# Patient Record
Sex: MALE | Race: WHITE | URBAN - METROPOLITAN AREA
[De-identification: names, ages, dates, MRNs, and addresses within clinical notes are randomized per-mention and may not be internally consistent; named-entity substitution may affect disease eponyms.]

---

## 2017-04-24 ENCOUNTER — OFFICE VISIT (OUTPATIENT)
Dept: FAMILY MEDICINE | Facility: CLINIC | Age: 42
End: 2017-04-24
Payer: COMMERCIAL

## 2017-04-24 VITALS
BODY MASS INDEX: 35.22 KG/M2 | HEART RATE: 87 BPM | DIASTOLIC BLOOD PRESSURE: 68 MMHG | OXYGEN SATURATION: 98 % | WEIGHT: 232.38 LBS | HEIGHT: 68 IN | TEMPERATURE: 98.5 F | SYSTOLIC BLOOD PRESSURE: 112 MMHG

## 2017-04-24 DIAGNOSIS — K76.0 FATTY INFILTRATION OF LIVER: ICD-10-CM

## 2017-04-24 DIAGNOSIS — F41.9 ANXIETY: ICD-10-CM

## 2017-04-24 DIAGNOSIS — E66.01 MORBID OBESITY DUE TO EXCESS CALORIES (H): ICD-10-CM

## 2017-04-24 DIAGNOSIS — E78.5 HYPERLIPIDEMIA LDL GOAL <100: ICD-10-CM

## 2017-04-24 DIAGNOSIS — G47.09 OTHER INSOMNIA: ICD-10-CM

## 2017-04-24 DIAGNOSIS — Z72.0 TOBACCO ABUSE: ICD-10-CM

## 2017-04-24 DIAGNOSIS — G47.33 OBSTRUCTIVE SLEEP APNEA: Primary | ICD-10-CM

## 2017-04-24 DIAGNOSIS — F10.20 UNCOMPLICATED ALCOHOL DEPENDENCE (H): ICD-10-CM

## 2017-04-24 DIAGNOSIS — I10 ESSENTIAL HYPERTENSION WITH GOAL BLOOD PRESSURE LESS THAN 140/90: ICD-10-CM

## 2017-04-24 DIAGNOSIS — R79.89 LOW TESTOSTERONE: ICD-10-CM

## 2017-04-24 LAB
BASOPHILS # BLD AUTO: 0 10E9/L (ref 0–0.2)
BASOPHILS NFR BLD AUTO: 0.5 %
DIFFERENTIAL METHOD BLD: NORMAL
EOSINOPHIL # BLD AUTO: 0.1 10E9/L (ref 0–0.7)
EOSINOPHIL NFR BLD AUTO: 2 %
ERYTHROCYTE [DISTWIDTH] IN BLOOD BY AUTOMATED COUNT: 13.6 % (ref 10–15)
HCT VFR BLD AUTO: 46.2 % (ref 40–53)
HGB BLD-MCNC: 15.4 G/DL (ref 13.3–17.7)
LYMPHOCYTES # BLD AUTO: 1.6 10E9/L (ref 0.8–5.3)
LYMPHOCYTES NFR BLD AUTO: 28.5 %
MCH RBC QN AUTO: 31.5 PG (ref 26.5–33)
MCHC RBC AUTO-ENTMCNC: 33.3 G/DL (ref 31.5–36.5)
MCV RBC AUTO: 95 FL (ref 78–100)
MONOCYTES # BLD AUTO: 0.5 10E9/L (ref 0–1.3)
MONOCYTES NFR BLD AUTO: 8.8 %
NEUTROPHILS # BLD AUTO: 3.4 10E9/L (ref 1.6–8.3)
NEUTROPHILS NFR BLD AUTO: 60.2 %
PLATELET # BLD AUTO: 194 10E9/L (ref 150–450)
RBC # BLD AUTO: 4.89 10E12/L (ref 4.4–5.9)
WBC # BLD AUTO: 5.6 10E9/L (ref 4–11)

## 2017-04-24 PROCEDURE — 83721 ASSAY OF BLOOD LIPOPROTEIN: CPT | Performed by: PHYSICIAN ASSISTANT

## 2017-04-24 PROCEDURE — 83001 ASSAY OF GONADOTROPIN (FSH): CPT | Performed by: PHYSICIAN ASSISTANT

## 2017-04-24 PROCEDURE — 99214 OFFICE O/P EST MOD 30 MIN: CPT | Performed by: PHYSICIAN ASSISTANT

## 2017-04-24 PROCEDURE — 80053 COMPREHEN METABOLIC PANEL: CPT | Performed by: PHYSICIAN ASSISTANT

## 2017-04-24 PROCEDURE — 84403 ASSAY OF TOTAL TESTOSTERONE: CPT | Performed by: PHYSICIAN ASSISTANT

## 2017-04-24 PROCEDURE — 85025 COMPLETE CBC W/AUTO DIFF WBC: CPT | Performed by: PHYSICIAN ASSISTANT

## 2017-04-24 PROCEDURE — 84270 ASSAY OF SEX HORMONE GLOBUL: CPT | Performed by: PHYSICIAN ASSISTANT

## 2017-04-24 PROCEDURE — 36415 COLL VENOUS BLD VENIPUNCTURE: CPT | Performed by: PHYSICIAN ASSISTANT

## 2017-04-24 RX ORDER — METOPROLOL SUCCINATE 100 MG/1
100 TABLET, EXTENDED RELEASE ORAL DAILY
Qty: 90 TABLET | Refills: 1 | Status: SHIPPED | OUTPATIENT
Start: 2017-04-24

## 2017-04-24 RX ORDER — ATORVASTATIN CALCIUM 20 MG/1
20 TABLET, FILM COATED ORAL DAILY
Qty: 90 TABLET | Refills: 1 | Status: SHIPPED | OUTPATIENT
Start: 2017-04-24

## 2017-04-24 RX ORDER — QUETIAPINE FUMARATE 25 MG/1
25-50 TABLET, FILM COATED ORAL AT BEDTIME
Qty: 60 TABLET | Refills: 3 | Status: SHIPPED | OUTPATIENT
Start: 2017-04-24

## 2017-04-24 NOTE — MR AVS SNAPSHOT
After Visit Summary   4/24/2017    David K Kjellberg    MRN: [id number]           Patient Information     Date Of Birth          1975        Visit Information        Provider Department      4/24/2017 4:00 PM Franklin Hayes PA-C [facility]        Today's Diagnoses     Obstructive sleep apnea-severe ()    -  1    Anxiety        Other insomnia        Essential hypertension with goal blood pressure less than 140/90        Hyperlipidemia LDL goal <100        Uncomplicated alcohol dependence (H)        Fatty infiltration of liver        Low testosterone           Follow-ups after your visit        Who to contact     If you have questions or need follow up information about today's clinic visit or your schedule please contact [facility] directly at [phone].  Normal or non-critical lab and imaging results will be communicated to you by Firebase[employer], letter or phone within 4 business days after the clinic has received the results. If you do not hear from us within 7 days, please contact the clinic through Firebasehart or phone. If you have a critical or abnormal lab result, we will notify you by phone as soon as possible.  Submit refill requests through [employer] or call your pharmacy and they will forward the refill request to us. Please allow 3 business days for your refill to be completed.          Additional Information About Your Visit        MyChart Information     [employer] gives you secure access to your electronic health record. If you see a primary care provider, you can also send messages to your care team and make appointments. If you have questions, please call your primary care clinic.  If you do not have a primary care provider, please call [phone] and they will assist you.        Care EveryWhere ID     This is your Care EveryWhere ID. This could be used by other organizations to access your [place] medical records  [id number]        Your Vitals Were   "   Pulse Temperature Height Pulse Oximetry BMI (Body Mass Index)       87 98.5  F (36.9  C) (Tympanic) 5' 8\" (1.727 m) 98% 35.33 kg/m2        Blood Pressure from Last 3 Encounters:   04/24/17 112/68   09/08/16 128/82   07/12/16 110/78    Weight from Last 3 Encounters:   04/24/17 232 lb 6 oz (105.4 kg)   09/08/16 230 lb (104.3 kg)   07/12/16 229 lb (103.9 kg)              We Performed the Following     CBC with platelets differential     Comprehensive metabolic panel     Follicle stimulating hormone     LDL cholesterol direct     Testosterone Free and Total          Today's Medication Changes          These changes are accurate as of: 4/24/17  4:11 PM.  If you have any questions, ask your nurse or doctor.               Stop taking these medicines if you haven't already. Please contact your care team if you have questions.     zaleplon 5 MG capsule   Commonly known as:  sonata   Stopped by:  Franklin Hayes PA-C                Where to get your medicines      These medications were sent to [place] [id number] [place] [facility] [place] [address] [facility] [address], [place] [address]     Phone:  [phone]     atorvastatin 20 MG tablet    metoprolol 100 MG 24 hr tablet    QUEtiapine 25 MG tablet                Primary Care Provider Office Phone # Fax #    Franklin Hayes PA-C [phone]       [address] [facility] [place] [address]        Thank you!     Thank you for choosing [facility]  for your care. Our goal is always to provide you with excellent care. Hearing back from our patients is one way we can continue to improve our services. Please take a few minutes to complete the written survey that you may receive in the mail after your visit with us. Thank you!             Your Updated Medication List - Protect others around you: Learn how to safely use, store and throw away your medicines at www.disposemymeds.org.          This list is accurate as of: " 4/24/17  4:11 PM.  Always use your most recent med list.                   Brand Name Dispense Instructions for use    atorvastatin 20 MG tablet    LIPITOR    90 tablet    Take 1 tablet (20 mg) by mouth daily       metoprolol 100 MG 24 hr tablet    TOPROL-XL    90 tablet    Take 1 tablet (100 mg) by mouth daily       QUEtiapine 25 MG tablet    SEROquel    60 tablet    Take 1-2 tablets (25-50 mg) by mouth At Bedtime

## 2017-04-24 NOTE — PROGRESS NOTES
SUBJECTIVE:                                                    David K Kjellberg is a 41 year old male who presents to clinic today for the following health issues:      Patient is here today to follow up with PCP  Patient mentions  Virgie. And wanting to try is medication and quit smoking.  Patient also would like to have some labs drawn    Hyperlipidemia Follow-Up      Rate your low fat/cholesterol diet?: not monitoring fat    Taking statin?  Stopped due to running out    Other lipid medications/supplements?:  none     Hypertension Follow-up      Outpatient blood pressures are not being checked.    Low Salt Diet: not monitoring salt       Problem list and histories reviewed & adjusted, as indicated.  Additional history: 40 y/o male here for follow up.  He does have complicated past medical history.  Much of his conditions are due to poor lifestyle/diet choices.  He has many problems with alcohol in the past, even doing going through some treatment.  Despite this, he does continue to drink, but only on weekend, and to a much lesser degree.  He has known fatty liver disease and hyperlipidemia.  Had been on lipitor, but did run out of refills.    He has had many issues with sleep including severe sleep apnea.  He has not been able to follow up with them as he was instructed.  He has also combination insomnia, and has been on many medications in the past.  Recently, he has been on seroquel, which did help his overall anxiety as well.      History of low testosterone, but has not been a good candidate due to liver disease, hyperlipidemia, and alcohol use.    BP Readings from Last 3 Encounters:   04/24/17 112/68   09/08/16 128/82   07/12/16 110/78    Wt Readings from Last 3 Encounters:   04/24/17 232 lb 6 oz (105.4 kg)   09/08/16 230 lb (104.3 kg)   07/12/16 229 lb (103.9 kg)                    Reviewed and updated as needed this visit by clinical staff  Tobacco  Allergies  Meds  Med Hx  Surg Hx  Fam Hx  Soc Hx  "     Reviewed and updated as needed this visit by Provider         ROS:  Constitutional, HEENT, cardiovascular, pulmonary, gi and gu systems are negative, except as otherwise noted.    OBJECTIVE:                                                    /68 (BP Location: Right arm, Patient Position: Right side, Cuff Size: Adult Large)  Pulse 87  Temp 98.5  F (36.9  C) (Tympanic)  Ht 5' 8\" (1.727 m)  Wt 232 lb 6 oz (105.4 kg)  SpO2 98%  BMI 35.33 kg/m2  Body mass index is 35.33 kg/(m^2).  GENERAL: healthy, alert, no distress and obese  EYES: Eyes grossly normal to inspection  HENT: ear canals and TM's normal, nose and mouth without ulcers or lesions  NECK: no adenopathy, no asymmetry, masses, or scars and thyroid normal to palpation  RESP: lungs clear to auscultation - no rales, rhonchi or wheezes  CV: regular rate and rhythm, normal S1 S2, no S3 or S4, no murmur, click or rub, no peripheral edema and peripheral pulses strong  PSYCH: mentation appears normal, affect normal/bright    Diagnostic Test Results:  none      ASSESSMENT/PLAN:                                                          Tobacco Cessation:   reports that he has been smoking Cigarettes.  He has been smoking about 1.00 pack per day for the past 0.00 years. He has never used smokeless tobacco.  Tobacco Cessation Action Plan: Pharmacotherapies : Chantix    BMI:   Estimated body mass index is 35.33 kg/(m^2) as calculated from the following:    Height as of this encounter: 5' 8\" (1.727 m).    Weight as of this encounter: 232 lb 6 oz (105.4 kg).   Weight management plan: Discussed healthy diet and exercise guidelines and patient will follow up in 6 months in clinic to re-evaluate.      1. Obstructive sleep apnea-severe ()  Will have him follow up with sleep health for further management.    2. Anxiety    - QUEtiapine (SEROQUEL) 25 MG tablet; Take 1-2 tablets (25-50 mg) by mouth At Bedtime  Dispense: 60 tablet; Refill: 3  - CBC with platelets " differential    3. Uncomplicated alcohol dependence (H)  Will recheck LFTs.  - Comprehensive metabolic panel      4. Morbid obesity due to excess calories (H)  Discussed dietary and lifestyle changes that need to take priority.    5. Other insomnia  - QUEtiapine (SEROQUEL) 25 MG tablet; Take 1-2 tablets (25-50 mg) by mouth At Bedtime  Dispense: 60 tablet; Refill: 3    6. Low testosterone  Will recheck based on patient preference, most likely not a good candidate for replacement.  - CBC with platelets differential  - Testosterone Free and Total  - Follicle stimulating hormone    7. Essential hypertension with goal blood pressure less than 140/90  At goal  - Comprehensive metabolic panel  - metoprolol (TOPROL-XL) 100 MG 24 hr tablet; Take 1 tablet (100 mg) by mouth daily  Dispense: 90 tablet; Refill: 1    8. Hyperlipidemia LDL goal <100  Will get back on  - Comprehensive metabolic panel  - LDL cholesterol direct  - atorvastatin (LIPITOR) 20 MG tablet; Take 1 tablet (20 mg) by mouth daily  Dispense: 90 tablet; Refill: 1    9. Fatty infiltration of liver      10. Tobacco abuse  He would like to try.  Discussed potential side effects.  - varenicline (CHANTIX STARTING MONTH CLINT) 0.5 MG X 11 & 1 MG X 42 tablet; Take 0.5 mg tab daily for 3 days, then 0.5 mg tab twice daily for 4 days, then 1 mg twice daily.  Dispense: 53 tablet; Refill: 0    Follow up 6 months.      Franklin Hayes PA-C  [facility]

## 2017-04-24 NOTE — NURSING NOTE
"Chief Complaint   Patient presents with     RECHECK     /68 (BP Location: Right arm, Patient Position: Right side, Cuff Size: Adult Large)  Pulse 87  Temp 98.5  F (36.9  C) (Tympanic)  Ht 5' 8\" (1.727 m)  Wt 232 lb 6 oz (105.4 kg)  SpO2 98%  BMI 35.33 kg/m2 Estimated body mass index is 35.33 kg/(m^2) as calculated from the following:    Height as of this encounter: 5' 8\" (1.727 m).    Weight as of this encounter: 232 lb 6 oz (105.4 kg).  bp completed using cuff size: large      Health Maintenance addressed:  NONE    n/a              "

## 2017-04-25 LAB
ALBUMIN SERPL-MCNC: 4.1 G/DL (ref 3.4–5)
ALP SERPL-CCNC: 63 U/L (ref 40–150)
ALT SERPL W P-5'-P-CCNC: 75 U/L (ref 0–70)
ANION GAP SERPL CALCULATED.3IONS-SCNC: 8 MMOL/L (ref 3–14)
AST SERPL W P-5'-P-CCNC: 36 U/L (ref 0–45)
BILIRUB SERPL-MCNC: 0.8 MG/DL (ref 0.2–1.3)
BUN SERPL-MCNC: 16 MG/DL (ref 7–30)
CALCIUM SERPL-MCNC: 9.6 MG/DL (ref 8.5–10.1)
CHLORIDE SERPL-SCNC: 106 MMOL/L (ref 94–109)
CO2 SERPL-SCNC: 27 MMOL/L (ref 20–32)
CREAT SERPL-MCNC: 0.88 MG/DL (ref 0.66–1.25)
FSH SERPL-ACNC: 3.4 IU/L (ref 0.7–10.8)
GFR SERPL CREATININE-BSD FRML MDRD: ABNORMAL ML/MIN/1.7M2
GLUCOSE SERPL-MCNC: 93 MG/DL (ref 70–99)
LDLC SERPL DIRECT ASSAY-MCNC: 202 MG/DL
POTASSIUM SERPL-SCNC: 4.5 MMOL/L (ref 3.4–5.3)
PROT SERPL-MCNC: 7.6 G/DL (ref 6.8–8.8)
SODIUM SERPL-SCNC: 141 MMOL/L (ref 133–144)

## 2017-04-26 LAB
SHBG SERPL-SCNC: 20 NMOL/L (ref 11–80)
TESTOST FREE SERPL-MCNC: 7 NG/DL (ref 4.7–24.4)
TESTOST SERPL-MCNC: 277 NG/DL (ref 240–950)

## 2017-04-27 PROBLEM — E66.01 MORBID OBESITY DUE TO EXCESS CALORIES (H): Status: ACTIVE | Noted: 2017-04-27

## 2017-04-27 NOTE — PROGRESS NOTES
Dear Uziel    Your test results are attached, feel free to contact me via [employer]    Your testosterone levels looks to be in ok zone.  Your liver enzymes have improved from last time, but not quite to goal.  Your cholesterol is elevated, as would be expected since you were off your medication.  The rest of your labs look to be in the expected range.      Abdirizak Hayes PA-C

## 2017-05-08 DIAGNOSIS — G47.33 OBSTRUCTIVE SLEEP APNEA: Primary | ICD-10-CM

## 2017-08-21 ENCOUNTER — TELEPHONE (OUTPATIENT)
Dept: SLEEP MEDICINE | Facility: CLINIC | Age: 42
End: 2017-08-21

## 2017-10-26 ENCOUNTER — DOCUMENTATION ONLY (OUTPATIENT)
Dept: SLEEP MEDICINE | Facility: CLINIC | Age: 42
End: 2017-10-26

## 2017-10-26 NOTE — NURSING NOTE
Received a fax from [facility] for cpap supplies. Placed form in providers in box to sign.    Spoke with the patient and he sated that he was through [facility] and was really happy but because his insurance changed he was forced to switch to [facility]. Patient is in the process of trying to switch his insurance over to another plan so that he may return to [facility] for his cpap supplies. Patient also asked that I send him a [employer] message with a reminder that he get his stuff through [facility] until he can check with [place] [employer] about his insurance.

## 2017-11-10 ENCOUNTER — TELEPHONE (OUTPATIENT)
Dept: SLEEP MEDICINE | Facility: CLINIC | Age: 42
End: 2017-11-10

## 2017-11-10 NOTE — TELEPHONE ENCOUNTER
Orders have been received from [employer] and have been signed, faxed and scanned into chart.    CAYETANO Ag

## 2017-11-13 NOTE — TELEPHONE ENCOUNTER
Orders have been re faxed due to date not noted on order.  Date has been added to order and re faxed.    CAYETANO Ag

## 2018-03-23 NOTE — PROGRESS NOTES
Received a fax from [employer]. Placed in providers box.  Christina Tan [facility]

## 2018-03-27 ENCOUNTER — DOCUMENTATION ONLY (OUTPATIENT)
Dept: SLEEP MEDICINE | Facility: CLINIC | Age: 43
End: 2018-03-27

## 2018-03-29 NOTE — PROGRESS NOTES
Orders faxed to [employer]. Documents sent to scanning.    Christina Tan [facility] [place] ~[place]

## 2018-05-11 ENCOUNTER — TELEPHONE (OUTPATIENT)
Dept: SLEEP MEDICINE | Facility: CLINIC | Age: 43
End: 2018-05-11

## 2018-05-11 NOTE — TELEPHONE ENCOUNTER
Orders rev'd from [facility] for cpap filter supplies and have been put in Jodi's box for signature.    CAYETANO Ag

## 2018-05-23 NOTE — TELEPHONE ENCOUNTER
Second order request has been placed in Jodi's box for review and signature.    Second order is for cushion replacement

## 2018-05-30 NOTE — TELEPHONE ENCOUNTER
Orders have been faxed back to [facility] and sent to scanning.    CAYETANO Ag

## 2018-06-06 ENCOUNTER — TELEPHONE (OUTPATIENT)
Dept: SLEEP MEDICINE | Facility: CLINIC | Age: 43
End: 2018-06-06

## 2018-06-06 NOTE — TELEPHONE ENCOUNTER
Orders rev'd from [facility] [place] for repair/parts order for cpap.  Orders have been put in Jodi's box for review and signature.    CAYETANO Ag

## 2018-06-07 ENCOUNTER — TELEPHONE (OUTPATIENT)
Dept: SLEEP MEDICINE | Facility: CLINIC | Age: 43
End: 2018-06-07

## 2018-06-07 NOTE — TELEPHONE ENCOUNTER
Orders rev'd on 5/25/18 from [email or web address] for cpap filters have been signed on 5/31 and faxed back.  Orders have been sent to scanning.    CAYETANO Ag

## 2018-07-27 ENCOUNTER — TELEPHONE (OUTPATIENT)
Dept: SLEEP MEDICINE | Facility: CLINIC | Age: 43
End: 2018-07-27

## 2018-07-27 NOTE — TELEPHONE ENCOUNTER
Spoke with the patient on 7/27/2018 at 10:45 AM and was told that his insurance changed and that he is no longer able to see us and is now going through [facility]. Orders for [facility] have been sent back to [facility] to send to patient's new provider.    Christina Tan [facility]

## 2018-07-27 NOTE — TELEPHONE ENCOUNTER
Received orders from [facility] for cpap supplies. Order given to provider to sign.    Christina Tan [facility] ~[place]

## 2018-08-03 NOTE — TELEPHONE ENCOUNTER
Received another order from [email or web address] for the patient. Called the customer service line and talked to Darlene at 2:25 PM who then transferred me to the clinic team where I left a voicemail asking them to call me so that I can tell them in person that the patient no longer is seen through us and all orders per the patient need to be sent to his provider at [facility].    Christina Tan [facility]

## 2020-02-23 ENCOUNTER — HEALTH MAINTENANCE LETTER (OUTPATIENT)
Age: 45
End: 2020-02-23